# Patient Record
Sex: FEMALE | Race: WHITE | NOT HISPANIC OR LATINO
[De-identification: names, ages, dates, MRNs, and addresses within clinical notes are randomized per-mention and may not be internally consistent; named-entity substitution may affect disease eponyms.]

---

## 2017-10-04 ENCOUNTER — APPOINTMENT (OUTPATIENT)
Dept: CARDIOLOGY | Facility: CLINIC | Age: 67
End: 2017-10-04
Payer: COMMERCIAL

## 2017-10-04 VITALS
WEIGHT: 150 LBS | BODY MASS INDEX: 25.61 KG/M2 | SYSTOLIC BLOOD PRESSURE: 132 MMHG | DIASTOLIC BLOOD PRESSURE: 78 MMHG | OXYGEN SATURATION: 99 % | HEIGHT: 64 IN | HEART RATE: 90 BPM

## 2017-10-04 DIAGNOSIS — E78.5 HYPERLIPIDEMIA, UNSPECIFIED: ICD-10-CM

## 2017-10-04 DIAGNOSIS — R07.89 OTHER CHEST PAIN: ICD-10-CM

## 2017-10-04 DIAGNOSIS — Z00.00 ENCOUNTER FOR GENERAL ADULT MEDICAL EXAMINATION W/OUT ABNORMAL FINDINGS: ICD-10-CM

## 2017-10-04 PROCEDURE — 99205 OFFICE O/P NEW HI 60 MIN: CPT

## 2017-10-04 PROCEDURE — 93000 ELECTROCARDIOGRAM COMPLETE: CPT

## 2017-10-04 RX ORDER — LEVOTHYROXINE SODIUM 0.07 MG/1
75 TABLET ORAL
Qty: 90 | Refills: 0 | Status: ACTIVE | COMMUNITY
Start: 2017-09-07

## 2017-10-04 RX ORDER — SIMVASTATIN 20 MG/1
20 TABLET, FILM COATED ORAL
Qty: 90 | Refills: 0 | Status: DISCONTINUED | COMMUNITY
Start: 2017-09-07

## 2017-10-04 RX ORDER — AZITHROMYCIN 250 MG/1
250 TABLET, FILM COATED ORAL
Qty: 6 | Refills: 0 | Status: ACTIVE | COMMUNITY
Start: 2017-04-10

## 2017-10-04 RX ORDER — OSELTAMIVIR PHOSPHATE 75 MG/1
75 CAPSULE ORAL
Qty: 10 | Refills: 0 | Status: DISCONTINUED | COMMUNITY
Start: 2017-04-10

## 2017-10-04 RX ORDER — SIMVASTATIN 20 MG/1
20 TABLET, FILM COATED ORAL
Refills: 0 | Status: ACTIVE | COMMUNITY

## 2017-10-04 RX ORDER — LEVOTHYROXINE SODIUM 0.07 MG/1
75 TABLET ORAL
Refills: 0 | Status: ACTIVE | COMMUNITY

## 2017-11-13 ENCOUNTER — APPOINTMENT (OUTPATIENT)
Dept: CARDIOLOGY | Facility: CLINIC | Age: 67
End: 2017-11-13
Payer: COMMERCIAL

## 2017-11-13 PROCEDURE — 78452 HT MUSCLE IMAGE SPECT MULT: CPT

## 2017-11-13 PROCEDURE — 93015 CV STRESS TEST SUPVJ I&R: CPT

## 2017-11-13 PROCEDURE — A9502: CPT

## 2017-12-06 ENCOUNTER — APPOINTMENT (OUTPATIENT)
Dept: CARDIOLOGY | Facility: CLINIC | Age: 67
End: 2017-12-06

## 2021-06-28 ENCOUNTER — TRANSCRIPTION ENCOUNTER (OUTPATIENT)
Age: 71
End: 2021-06-28

## 2021-08-05 ENCOUNTER — OUTPATIENT (OUTPATIENT)
Dept: OUTPATIENT SERVICES | Facility: HOSPITAL | Age: 71
LOS: 1 days | End: 2021-08-05

## 2021-08-06 ENCOUNTER — APPOINTMENT (OUTPATIENT)
Dept: ULTRASOUND IMAGING | Facility: CLINIC | Age: 71
End: 2021-08-06
Payer: COMMERCIAL

## 2021-08-06 PROCEDURE — 76700 US EXAM ABDOM COMPLETE: CPT

## 2021-08-23 ENCOUNTER — TRANSCRIPTION ENCOUNTER (OUTPATIENT)
Age: 71
End: 2021-08-23

## 2022-01-26 ENCOUNTER — RESULT REVIEW (OUTPATIENT)
Age: 72
End: 2022-01-26

## 2022-03-07 ENCOUNTER — TRANSCRIPTION ENCOUNTER (OUTPATIENT)
Age: 72
End: 2022-03-07

## 2022-04-04 ENCOUNTER — TRANSCRIPTION ENCOUNTER (OUTPATIENT)
Age: 72
End: 2022-04-04

## 2022-06-05 ENCOUNTER — NON-APPOINTMENT (OUTPATIENT)
Age: 72
End: 2022-06-05

## 2022-12-12 ENCOUNTER — OFFICE (OUTPATIENT)
Dept: URBAN - METROPOLITAN AREA CLINIC 12 | Facility: CLINIC | Age: 72
Setting detail: OPHTHALMOLOGY
End: 2022-12-12
Payer: COMMERCIAL

## 2022-12-12 DIAGNOSIS — H25.12: ICD-10-CM

## 2022-12-12 DIAGNOSIS — H25.13: ICD-10-CM

## 2022-12-12 PROCEDURE — 99213 OFFICE O/P EST LOW 20 MIN: CPT | Performed by: OPHTHALMOLOGY

## 2022-12-12 PROCEDURE — 92136 OPHTHALMIC BIOMETRY: CPT | Performed by: OPHTHALMOLOGY

## 2022-12-12 ASSESSMENT — REFRACTION_CURRENTRX
OS_OVR_VA: 20/
OD_OVR_VA: 20/
OD_SPHERE: +1.50
OS_OVR_VA: 20/
OD_VPRISM_DIRECTION: PROGS
OD_AXIS: 113
OD_AXIS: 22
OD_CYLINDER: +1.00
OS_AXIS: 160
OD_SPHERE: +5.75
OD_VPRISM_DIRECTION: PROGS
OS_ADD: +2.50
OS_SPHERE: +3.00
OD_CYLINDER: -1.25
OS_VPRISM_DIRECTION: SV
OD_AXIS: 121
OD_VPRISM_DIRECTION: SV
OS_CYLINDER: -2.25
OS_ADD: +2.25
OS_SPHERE: -1.50
OS_CYLINDER: -1.50
OD_ADD: +2.50
OS_VPRISM_DIRECTION: PROGS
OD_OVR_VA: 20/
OD_OVR_VA: 20/
OS_VPRISM_DIRECTION: PROGS
OD_SPHERE: +4.00
OS_SPHERE: +1.25
OS_AXIS: 099
OS_CYLINDER: +1.00
OD_ADD: +2.25
OS_AXIS: 082
OD_CYLINDER: -1.00
OS_OVR_VA: 20/

## 2022-12-12 ASSESSMENT — AXIALLENGTH_DERIVED
OS_AL: 23.5947
OD_AL: 22.3028
OS_AL: 23.4495
OS_AL: 23.5947
OD_AL: 22.2594
OD_AL: 22.2161

## 2022-12-12 ASSESSMENT — REFRACTION_AUTOREFRACTION
OS_CYLINDER: -1.75
OD_AXIS: 121
OS_AXIS: 085
OS_SPHERE: +0.75
OD_SPHERE: +4.25
OD_CYLINDER: -1.25

## 2022-12-12 ASSESSMENT — REFRACTION_MANIFEST
OS_AXIS: 085
OU_VA: 20/20-1
OS_VA2: 20/20
OD_CYLINDER: -1.50
OS_VA2: 20/20
OS_CYLINDER: -2.00
OS_VA1: 20/NI
OD_SPHERE: +4.25
OD_AXIS: 111
OS_SPHERE: +1.25
OD_VA1: 20/NI
OS_ADD: +2.50
OD_SPHERE: +4.25
OD_AXIS: 120
OS_CYLINDER: -1.75
OS_AXIS: 85
OS_SPHERE: +0.75
OD_ADD: +2.50
OD_VA2: 20/20
OS_VA1: 20/25
OD_ADD: +2.50
OD_VA2: 20/20
OU_VA: 20/20-1
OS_ADD: +2.50
OD_VA1: 20/25+
OD_CYLINDER: -1.00

## 2022-12-12 ASSESSMENT — CONFRONTATIONAL VISUAL FIELD TEST (CVF)
OS_FINDINGS: FULL
OD_FINDINGS: FULL

## 2022-12-12 ASSESSMENT — SPHEQUIV_DERIVED
OS_SPHEQUIV: 0.25
OS_SPHEQUIV: -0.125
OD_SPHEQUIV: 3.625
OD_SPHEQUIV: 3.5
OS_SPHEQUIV: -0.125
OD_SPHEQUIV: 3.75

## 2022-12-12 ASSESSMENT — TONOMETRY
OS_IOP_MMHG: 11
OD_IOP_MMHG: 12

## 2022-12-12 ASSESSMENT — VISUAL ACUITY
OD_BCVA: 20/30-2
OS_BCVA: 20/30

## 2022-12-12 ASSESSMENT — KERATOMETRY
OD_AXISANGLE_DEGREES: 021
OS_AXISANGLE_DEGREES: 001
OS_K1POWER_DIOPTERS: 42.75
OS_K2POWER_DIOPTERS: 44.50
METHOD_AUTO_MANUAL: AUTO
OD_K1POWER_DIOPTERS: 42.75
OD_K2POWER_DIOPTERS: 44.25

## 2022-12-12 ASSESSMENT — LID POSITION - DERMATOCHALASIS
OS_DERMATOCHALASIS: LUL 1+
OD_DERMATOCHALASIS: RUL 1+

## 2022-12-12 ASSESSMENT — LID EXAM ASSESSMENTS
OD_BLEPHARITIS: RUL 1+
OS_BLEPHARITIS: LUL 1+

## 2022-12-20 ENCOUNTER — APPOINTMENT (OUTPATIENT)
Dept: ORTHOPEDIC SURGERY | Facility: CLINIC | Age: 72
End: 2022-12-20

## 2022-12-20 VITALS — BODY MASS INDEX: 25.61 KG/M2 | WEIGHT: 150 LBS | HEIGHT: 64 IN

## 2022-12-20 DIAGNOSIS — Z78.9 OTHER SPECIFIED HEALTH STATUS: ICD-10-CM

## 2022-12-20 DIAGNOSIS — E78.00 PURE HYPERCHOLESTEROLEMIA, UNSPECIFIED: ICD-10-CM

## 2022-12-20 PROCEDURE — 73130 X-RAY EXAM OF HAND: CPT | Mod: 50

## 2022-12-20 PROCEDURE — 99204 OFFICE O/P NEW MOD 45 MIN: CPT

## 2022-12-20 PROCEDURE — 99072 ADDL SUPL MATRL&STAF TM PHE: CPT
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
## 2022-12-25 NOTE — ASSESSMENT
[FreeTextEntry1] : Bilateral CTS - reviewed pathoanatomy. Encouraged nighttime cockup wrist bracing. Consider EMG/NCV if symptoms persist. NSAIDs, minimize use.\par \par F/u 4weeks

## 2022-12-25 NOTE — IMAGING
[de-identified] : Right wrist with no swelling nor erythema. Able to make fist, oppose thumb to small finger and abduct fingers, no overt atrophy. +Phalen's, -tinel at carpal, -tinel at Guyon, -tinel at cubital. -froment, -wartenberg. Intact sensation in median and intact at superficial radial and intact in small and ulnar ring finger(normal at ulnar hand) prior to provocative testing. <2sec cap refill. \par \par Right wrist radiographs with no fracture nor dislocation. Carpus aligned.\par \par \par Left wrist with no swelling nor erythema. Able to make fist, oppose thumb to small finger and abduct fingers, no overt atrophy. +Phalen's, -tinel at carpal, -tinel at Guyon, -tinel at cubital. -froment, -wartenberg. Intact sensation in median and intact at superficial radial and intact in small and ulnar ring finger(normal at ulnar hand) prior to provocative testing. <2sec cap refill. \par \par Left wrist radiographs with no fracture nor dislocation. Carpus aligned.

## 2023-01-06 ENCOUNTER — OFFICE (OUTPATIENT)
Dept: URBAN - METROPOLITAN AREA CLINIC 12 | Facility: CLINIC | Age: 73
Setting detail: OPHTHALMOLOGY
End: 2023-01-06
Payer: COMMERCIAL

## 2023-01-06 DIAGNOSIS — Z01.812: ICD-10-CM

## 2023-01-06 DIAGNOSIS — Z20.822: ICD-10-CM

## 2023-01-06 PROCEDURE — 99211 OFF/OP EST MAY X REQ PHY/QHP: CPT | Performed by: OPHTHALMOLOGY

## 2023-01-06 ASSESSMENT — REFRACTION_MANIFEST
OS_AXIS: 085
OS_VA2: 20/20
OS_ADD: +2.50
OD_AXIS: 120
OS_AXIS: 85
OD_ADD: +2.50
OD_ADD: +2.50
OS_VA2: 20/20
OS_VA1: 20/NI
OS_ADD: +2.50
OS_VA1: 20/25
OS_SPHERE: +0.75
OS_CYLINDER: -2.00
OD_VA2: 20/20
OD_SPHERE: +4.25
OD_AXIS: 111
OD_VA2: 20/20
OU_VA: 20/20-1
OD_SPHERE: +4.25
OS_CYLINDER: -1.75
OU_VA: 20/20-1
OD_CYLINDER: -1.50
OD_VA1: 20/25+
OD_CYLINDER: -1.00
OD_VA1: 20/NI
OS_SPHERE: +1.25

## 2023-01-06 ASSESSMENT — REFRACTION_CURRENTRX
OD_CYLINDER: +1.00
OS_VPRISM_DIRECTION: PROGS
OS_OVR_VA: 20/
OS_CYLINDER: -1.50
OD_OVR_VA: 20/
OS_OVR_VA: 20/
OD_ADD: +2.50
OS_SPHERE: +1.25
OS_SPHERE: +3.00
OS_AXIS: 082
OS_SPHERE: -1.50
OD_AXIS: 22
OD_OVR_VA: 20/
OD_CYLINDER: -1.00
OS_VPRISM_DIRECTION: SV
OD_ADD: +2.25
OD_CYLINDER: -1.25
OD_VPRISM_DIRECTION: PROGS
OD_SPHERE: +5.75
OD_AXIS: 113
OD_OVR_VA: 20/
OS_AXIS: 160
OS_VPRISM_DIRECTION: PROGS
OS_AXIS: 099
OD_SPHERE: +1.50
OS_CYLINDER: -2.25
OS_CYLINDER: +1.00
OD_AXIS: 121
OD_VPRISM_DIRECTION: SV
OD_VPRISM_DIRECTION: PROGS
OS_ADD: +2.50
OD_SPHERE: +4.00
OS_OVR_VA: 20/
OS_ADD: +2.25

## 2023-01-06 ASSESSMENT — AXIALLENGTH_DERIVED
OD_AL: 22.2594
OD_AL: 22.2161
OS_AL: 23.5947
OS_AL: 23.5947
OD_AL: 22.3028
OS_AL: 23.4495

## 2023-01-06 ASSESSMENT — SPHEQUIV_DERIVED
OD_SPHEQUIV: 3.625
OS_SPHEQUIV: -0.125
OS_SPHEQUIV: -0.125
OS_SPHEQUIV: 0.25
OD_SPHEQUIV: 3.75
OD_SPHEQUIV: 3.5

## 2023-01-06 ASSESSMENT — KERATOMETRY
OD_K2POWER_DIOPTERS: 44.25
OS_K1POWER_DIOPTERS: 42.75
OS_K2POWER_DIOPTERS: 44.50
OD_K1POWER_DIOPTERS: 42.75
OS_AXISANGLE_DEGREES: 001
OD_AXISANGLE_DEGREES: 021
METHOD_AUTO_MANUAL: AUTO

## 2023-01-06 ASSESSMENT — VISUAL ACUITY
OS_BCVA: 20/30
OD_BCVA: 20/30-2

## 2023-01-06 ASSESSMENT — REFRACTION_AUTOREFRACTION
OD_CYLINDER: -1.25
OS_AXIS: 085
OS_CYLINDER: -1.75
OD_AXIS: 121
OD_SPHERE: +4.25
OS_SPHERE: +0.75

## 2023-01-10 ENCOUNTER — AMBULATORY SURGERY CENTER (OUTPATIENT)
Dept: URBAN - METROPOLITAN AREA SURGERY 27 | Facility: SURGERY | Age: 73
Setting detail: OPHTHALMOLOGY
End: 2023-01-10
Payer: COMMERCIAL

## 2023-01-10 DIAGNOSIS — H52.212: ICD-10-CM

## 2023-01-10 DIAGNOSIS — H25.12: ICD-10-CM

## 2023-01-10 PROCEDURE — FEMTO CATARACT LASER: Performed by: OPHTHALMOLOGY

## 2023-01-10 PROCEDURE — 66984 XCAPSL CTRC RMVL W/O ECP: CPT | Performed by: OPHTHALMOLOGY

## 2023-01-11 ENCOUNTER — RX ONLY (RX ONLY)
Age: 73
End: 2023-01-11

## 2023-01-11 ENCOUNTER — OFFICE (OUTPATIENT)
Dept: URBAN - METROPOLITAN AREA CLINIC 12 | Facility: CLINIC | Age: 73
Setting detail: OPHTHALMOLOGY
End: 2023-01-11
Payer: COMMERCIAL

## 2023-01-11 DIAGNOSIS — Z96.1: ICD-10-CM

## 2023-01-11 PROCEDURE — 99024 POSTOP FOLLOW-UP VISIT: CPT | Performed by: OPTOMETRIST

## 2023-01-11 ASSESSMENT — REFRACTION_CURRENTRX
OS_ADD: +2.50
OD_SPHERE: +4.00
OS_CYLINDER: -2.25
OS_OVR_VA: 20/
OD_ADD: +2.50
OD_ADD: +2.25
OS_AXIS: 160
OD_AXIS: 113
OD_SPHERE: +5.75
OD_AXIS: 22
OS_OVR_VA: 20/
OD_CYLINDER: -1.00
OS_AXIS: 082
OD_OVR_VA: 20/
OS_CYLINDER: -1.50
OD_VPRISM_DIRECTION: PROGS
OS_SPHERE: +1.25
OD_AXIS: 121
OD_VPRISM_DIRECTION: SV
OD_OVR_VA: 20/
OS_SPHERE: +3.00
OD_OVR_VA: 20/
OS_AXIS: 099
OS_VPRISM_DIRECTION: SV
OS_CYLINDER: +1.00
OD_VPRISM_DIRECTION: PROGS
OS_ADD: +2.25
OS_OVR_VA: 20/
OS_SPHERE: -1.50
OS_VPRISM_DIRECTION: PROGS
OD_CYLINDER: -1.25
OD_CYLINDER: +1.00
OD_SPHERE: +1.50
OS_VPRISM_DIRECTION: PROGS

## 2023-01-11 ASSESSMENT — SPHEQUIV_DERIVED
OD_SPHEQUIV: 3.75
OS_SPHEQUIV: -0.5
OD_SPHEQUIV: 3.5
OS_SPHEQUIV: -0.125
OS_SPHEQUIV: 0.25
OD_SPHEQUIV: 3.625

## 2023-01-11 ASSESSMENT — KERATOMETRY
OD_K2POWER_DIOPTERS: 44.00
OS_K1POWER_DIOPTERS: 43.00
OS_AXISANGLE_DEGREES: 179
METHOD_AUTO_MANUAL: AUTO
OD_K1POWER_DIOPTERS: 42.75
OS_K2POWER_DIOPTERS: 44.00
OD_AXISANGLE_DEGREES: 015

## 2023-01-11 ASSESSMENT — LID POSITION - DERMATOCHALASIS
OS_DERMATOCHALASIS: LUL 1+
OD_DERMATOCHALASIS: RUL 1+

## 2023-01-11 ASSESSMENT — REFRACTION_AUTOREFRACTION
OD_AXIS: 109
OD_CYLINDER: -1.25
OS_SPHERE: -0.25
OS_AXIS: 126
OS_CYLINDER: -0.50
OD_SPHERE: +4.25

## 2023-01-11 ASSESSMENT — AXIALLENGTH_DERIVED
OD_AL: 22.3003
OD_AL: 22.3439
OS_AL: 23.4949
OD_AL: 22.2569
OS_AL: 23.7883
OS_AL: 23.6407

## 2023-01-11 ASSESSMENT — REFRACTION_MANIFEST
OS_SPHERE: +0.75
OD_SPHERE: +4.25
OD_AXIS: 120
OS_SPHERE: +1.25
OD_ADD: +2.50
OS_VA1: 20/25
OS_AXIS: 085
OD_VA2: 20/20
OD_VA1: 20/NI
OS_VA1: 20/NI
OU_VA: 20/20-1
OS_ADD: +2.50
OS_VA2: 20/20
OD_SPHERE: +4.25
OD_VA1: 20/25+
OS_CYLINDER: -1.75
OD_VA2: 20/20
OU_VA: 20/20-1
OD_CYLINDER: -1.00
OD_ADD: +2.50
OD_CYLINDER: -1.50
OS_CYLINDER: -2.00
OD_AXIS: 111
OS_ADD: +2.50
OS_VA2: 20/20
OS_AXIS: 85

## 2023-01-11 ASSESSMENT — CONFRONTATIONAL VISUAL FIELD TEST (CVF)
OD_FINDINGS: FULL
OS_FINDINGS: FULL

## 2023-01-11 ASSESSMENT — VISUAL ACUITY
OS_BCVA: 20/25-1
OD_BCVA: 20/25-2

## 2023-01-11 ASSESSMENT — TONOMETRY: OD_IOP_MMHG: 14

## 2023-01-11 ASSESSMENT — LID EXAM ASSESSMENTS
OD_BLEPHARITIS: RUL 1+
OS_BLEPHARITIS: LUL 1+

## 2023-01-16 ENCOUNTER — OFFICE (OUTPATIENT)
Dept: URBAN - METROPOLITAN AREA CLINIC 12 | Facility: CLINIC | Age: 73
Setting detail: OPHTHALMOLOGY
End: 2023-01-16
Payer: COMMERCIAL

## 2023-01-16 DIAGNOSIS — H25.11: ICD-10-CM

## 2023-01-16 PROCEDURE — 92136 OPHTHALMIC BIOMETRY: CPT | Performed by: OPHTHALMOLOGY

## 2023-01-16 ASSESSMENT — REFRACTION_MANIFEST
OS_VA2: 20/20
OS_AXIS: 085
OD_ADD: +2.50
OU_VA: 20/20-1
OS_ADD: +2.50
OD_VA1: 20/25+
OS_CYLINDER: -2.00
OD_SPHERE: +4.25
OD_AXIS: 111
OS_SPHERE: +1.25
OD_VA2: 20/20
OD_VA1: 20/NI
OS_VA2: 20/20
OD_CYLINDER: -1.50
OS_SPHERE: +0.75
OD_ADD: +2.50
OS_CYLINDER: -1.75
OD_AXIS: 120
OD_VA2: 20/20
OS_VA1: 20/25
OS_VA1: 20/NI
OU_VA: 20/20-1
OD_SPHERE: +4.25
OD_CYLINDER: -1.00
OS_AXIS: 85
OS_ADD: +2.50

## 2023-01-16 ASSESSMENT — KERATOMETRY
OS_AXISANGLE_DEGREES: 175
OD_K2POWER_DIOPTERS: 44.00
METHOD_AUTO_MANUAL: AUTO
OD_K1POWER_DIOPTERS: 42.50
OD_AXISANGLE_DEGREES: 21
OS_K1POWER_DIOPTERS: 43.00
OS_K2POWER_DIOPTERS: 44.25

## 2023-01-16 ASSESSMENT — LID EXAM ASSESSMENTS
OS_BLEPHARITIS: LUL 1+
OD_BLEPHARITIS: RUL 1+

## 2023-01-16 ASSESSMENT — REFRACTION_CURRENTRX
OD_CYLINDER: +1.00
OD_SPHERE: +1.50
OD_SPHERE: +5.75
OD_AXIS: 116
OD_OVR_VA: 20/
OD_VPRISM_DIRECTION: PROGS
OD_SPHERE: +4.25
OS_OVR_VA: 20/
OS_VPRISM_DIRECTION: SV
OD_AXIS: 113
OD_CYLINDER: -1.25
OS_CYLINDER: +1.00
OS_VPRISM_DIRECTION: PROGS
OS_ADD: +2.25
OS_OVR_VA: 20/
OD_OVR_VA: 20/
OD_OVR_VA: 20/
OS_AXIS: 160
OS_AXIS: 082
OD_ADD: +2.25
OS_SPHERE: +3.00
OD_VPRISM_DIRECTION: SV
OS_OVR_VA: 20/
OD_CYLINDER: -0.75
OD_AXIS: 22
OS_CYLINDER: -1.50
OS_SPHERE: -1.50
OD_VPRISM_DIRECTION: PROGS
OD_ADD: +2.50

## 2023-01-16 ASSESSMENT — CONFRONTATIONAL VISUAL FIELD TEST (CVF)
OD_FINDINGS: FULL
OS_FINDINGS: FULL

## 2023-01-16 ASSESSMENT — SPHEQUIV_DERIVED
OS_SPHEQUIV: 0.25
OD_SPHEQUIV: 3.5
OS_SPHEQUIV: -0.625
OS_SPHEQUIV: -0.125
OD_SPHEQUIV: 3.625
OD_SPHEQUIV: 3.75

## 2023-01-16 ASSESSMENT — REFRACTION_AUTOREFRACTION
OD_CYLINDER: -1.25
OS_AXIS: 146
OS_CYLINDER: -0.25
OD_AXIS: 114
OD_SPHERE: +4.25
OS_SPHERE: -0.50

## 2023-01-16 ASSESSMENT — AXIALLENGTH_DERIVED
OD_AL: 22.2978
OS_AL: 23.4495
OD_AL: 22.3414
OS_AL: 23.7912
OS_AL: 23.5947
OD_AL: 22.3852

## 2023-01-16 ASSESSMENT — VISUAL ACUITY
OS_BCVA: 20/20-1
OD_BCVA: 20/25+2

## 2023-01-16 ASSESSMENT — LID POSITION - DERMATOCHALASIS
OS_DERMATOCHALASIS: LUL 1+
OD_DERMATOCHALASIS: RUL 1+

## 2023-01-16 ASSESSMENT — TONOMETRY
OD_IOP_MMHG: 15
OS_IOP_MMHG: 16

## 2023-01-20 ENCOUNTER — APPOINTMENT (OUTPATIENT)
Dept: ORTHOPEDIC SURGERY | Facility: CLINIC | Age: 73
End: 2023-01-20
Payer: COMMERCIAL

## 2023-01-20 ENCOUNTER — OFFICE (OUTPATIENT)
Dept: URBAN - METROPOLITAN AREA CLINIC 12 | Facility: CLINIC | Age: 73
Setting detail: OPHTHALMOLOGY
End: 2023-01-20
Payer: COMMERCIAL

## 2023-01-20 DIAGNOSIS — Z01.812: ICD-10-CM

## 2023-01-20 DIAGNOSIS — Z20.822: ICD-10-CM

## 2023-01-20 DIAGNOSIS — G56.00 CARPAL TUNNEL SYNDROME, UNSPECIFIED UPPER LIMB: ICD-10-CM

## 2023-01-20 PROCEDURE — 99214 OFFICE O/P EST MOD 30 MIN: CPT

## 2023-01-20 PROCEDURE — 99072 ADDL SUPL MATRL&STAF TM PHE: CPT

## 2023-01-20 PROCEDURE — 99211 OFF/OP EST MAY X REQ PHY/QHP: CPT | Performed by: OPHTHALMOLOGY

## 2023-01-20 ASSESSMENT — REFRACTION_AUTOREFRACTION
OS_SPHERE: -0.50
OD_AXIS: 114
OS_CYLINDER: -0.25
OD_SPHERE: +4.25
OD_CYLINDER: -1.25
OS_AXIS: 146

## 2023-01-20 ASSESSMENT — REFRACTION_CURRENTRX
OD_SPHERE: +1.50
OS_SPHERE: +3.00
OD_ADD: +2.50
OD_CYLINDER: -0.75
OS_ADD: +2.25
OS_VPRISM_DIRECTION: PROGS
OD_AXIS: 113
OD_VPRISM_DIRECTION: SV
OD_AXIS: 116
OD_SPHERE: +4.25
OS_CYLINDER: +1.00
OS_CYLINDER: -1.50
OS_AXIS: 160
OS_OVR_VA: 20/
OD_SPHERE: +5.75
OD_OVR_VA: 20/
OD_CYLINDER: +1.00
OD_ADD: +2.25
OS_SPHERE: -1.50
OS_OVR_VA: 20/
OS_OVR_VA: 20/
OD_CYLINDER: -1.25
OS_AXIS: 082
OD_VPRISM_DIRECTION: PROGS
OD_AXIS: 22
OD_VPRISM_DIRECTION: PROGS
OS_VPRISM_DIRECTION: SV
OD_OVR_VA: 20/
OD_OVR_VA: 20/

## 2023-01-20 ASSESSMENT — AXIALLENGTH_DERIVED
OS_AL: 23.7912
OD_AL: 22.3852
OS_AL: 23.4495
OS_AL: 23.5947
OD_AL: 22.3414
OD_AL: 22.2978

## 2023-01-20 ASSESSMENT — REFRACTION_MANIFEST
OD_CYLINDER: -1.00
OD_VA2: 20/20
OU_VA: 20/20-1
OD_VA1: 20/25+
OS_CYLINDER: -1.75
OD_VA2: 20/20
OS_VA2: 20/20
OS_AXIS: 085
OD_ADD: +2.50
OS_SPHERE: +1.25
OS_ADD: +2.50
OD_ADD: +2.50
OS_AXIS: 85
OS_CYLINDER: -2.00
OD_SPHERE: +4.25
OD_AXIS: 111
OS_VA2: 20/20
OD_CYLINDER: -1.50
OS_SPHERE: +0.75
OS_VA1: 20/25
OD_SPHERE: +4.25
OS_VA1: 20/NI
OU_VA: 20/20-1
OD_AXIS: 120
OS_ADD: +2.50
OD_VA1: 20/NI

## 2023-01-20 ASSESSMENT — KERATOMETRY
OS_AXISANGLE_DEGREES: 175
OD_K2POWER_DIOPTERS: 44.00
OD_K1POWER_DIOPTERS: 42.50
OS_K1POWER_DIOPTERS: 43.00
METHOD_AUTO_MANUAL: AUTO
OD_AXISANGLE_DEGREES: 21
OS_K2POWER_DIOPTERS: 44.25

## 2023-01-20 ASSESSMENT — SPHEQUIV_DERIVED
OS_SPHEQUIV: -0.625
OD_SPHEQUIV: 3.625
OS_SPHEQUIV: -0.125
OS_SPHEQUIV: 0.25
OD_SPHEQUIV: 3.5
OD_SPHEQUIV: 3.75

## 2023-01-20 ASSESSMENT — VISUAL ACUITY
OS_BCVA: 20/20-1
OD_BCVA: 20/25+2

## 2023-01-20 NOTE — IMAGING
[de-identified] : Right wrist with no swelling nor erythema. Able to make fist, oppose thumb to small finger and abduct fingers, no overt atrophy. +Phalen's, -tinel at carpal, -tinel at Guyon, -tinel at cubital. -froment, -wartenberg. Intact sensation in median and intact at superficial radial and intact in small and ulnar ring finger(normal at ulnar hand) prior to provocative testing. <2sec cap refill. \par \par Right wrist radiographs with no fracture nor dislocation. Carpus aligned.\par \par \par Left wrist with no swelling nor erythema. Able to make fist, oppose thumb to small finger and abduct fingers, no overt atrophy. +Phalen's, -tinel at carpal, -tinel at Guyon, -tinel at cubital. -froment, -wartenberg. Intact sensation in median and intact at superficial radial and intact in small and ulnar ring finger(normal at ulnar hand) prior to provocative testing. <2sec cap refill. \par \par Left wrist radiographs with no fracture nor dislocation. Carpus aligned.

## 2023-01-20 NOTE — ASSESSMENT
[FreeTextEntry1] : Bilateral CTS - reviewed pathoanatomy. Encouraged nighttime cockup wrist bracing. Will obtain bilateral UE EMG/NCV and followup thereafter. Discussed that the discoloration of the fingers may be secondary to a vascular insult, possible Raynauds. Ring finger (R>L) appear to possess a hyperemia reflow.\par \par F/u after EMG/NCV
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
## 2023-01-24 ENCOUNTER — AMBULATORY SURGERY CENTER (OUTPATIENT)
Dept: URBAN - METROPOLITAN AREA SURGERY 27 | Facility: SURGERY | Age: 73
Setting detail: OPHTHALMOLOGY
End: 2023-01-24
Payer: COMMERCIAL

## 2023-01-24 DIAGNOSIS — H25.11: ICD-10-CM

## 2023-01-24 DIAGNOSIS — H52.211: ICD-10-CM

## 2023-01-24 PROCEDURE — 66984 XCAPSL CTRC RMVL W/O ECP: CPT | Performed by: OPHTHALMOLOGY

## 2023-01-24 PROCEDURE — FEMTO CATARACT LASER: Performed by: OPHTHALMOLOGY

## 2023-01-25 ENCOUNTER — OFFICE (OUTPATIENT)
Dept: URBAN - METROPOLITAN AREA CLINIC 12 | Facility: CLINIC | Age: 73
Setting detail: OPHTHALMOLOGY
End: 2023-01-25
Payer: COMMERCIAL

## 2023-01-25 ENCOUNTER — RX ONLY (RX ONLY)
Age: 73
End: 2023-01-25

## 2023-01-25 DIAGNOSIS — Z96.1: ICD-10-CM

## 2023-01-25 PROCEDURE — 99024 POSTOP FOLLOW-UP VISIT: CPT | Performed by: OPTOMETRIST

## 2023-01-25 ASSESSMENT — REFRACTION_CURRENTRX
OD_SPHERE: +4.25
OS_VPRISM_DIRECTION: PROGS
OD_VPRISM_DIRECTION: SV
OD_CYLINDER: -0.75
OS_CYLINDER: -1.50
OD_AXIS: 113
OD_ADD: +2.25
OS_ADD: +2.25
OS_AXIS: 160
OS_SPHERE: -1.50
OD_CYLINDER: -1.25
OS_OVR_VA: 20/
OD_AXIS: 22
OD_OVR_VA: 20/
OS_AXIS: 082
OD_OVR_VA: 20/
OD_SPHERE: +5.75
OD_VPRISM_DIRECTION: PROGS
OD_ADD: +2.50
OS_OVR_VA: 20/
OS_OVR_VA: 20/
OS_SPHERE: +3.00
OD_CYLINDER: +1.00
OS_CYLINDER: +1.00
OS_VPRISM_DIRECTION: SV
OD_AXIS: 116
OD_SPHERE: +1.50
OD_OVR_VA: 20/
OD_VPRISM_DIRECTION: PROGS

## 2023-01-25 ASSESSMENT — REFRACTION_MANIFEST
OS_VA1: 20/NI
OD_SPHERE: +4.25
OD_VA2: 20/20
OD_VA2: 20/20
OD_CYLINDER: -1.00
OU_VA: 20/20-1
OS_VA2: 20/20
OS_CYLINDER: -2.00
OD_AXIS: 120
OS_CYLINDER: -1.75
OD_VA1: 20/25+
OD_ADD: +2.50
OD_ADD: +2.50
OS_AXIS: 085
OS_VA2: 20/20
OU_VA: 20/20-1
OS_ADD: +2.50
OD_VA1: 20/NI
OD_AXIS: 111
OD_SPHERE: +4.25
OS_AXIS: 85
OS_SPHERE: +0.75
OD_CYLINDER: -1.50
OS_VA1: 20/25
OS_SPHERE: +1.25
OS_ADD: +2.50

## 2023-01-25 ASSESSMENT — AXIALLENGTH_DERIVED
OD_AL: 22.3852
OD_AL: 23.6841
OS_AL: 23.5947
OS_AL: 23.4495
OD_AL: 22.2978
OS_AL: 23.5461

## 2023-01-25 ASSESSMENT — KERATOMETRY
OD_K1POWER_DIOPTERS: 42.75
OS_AXISANGLE_DEGREES: 173
OS_K2POWER_DIOPTERS: 44.25
OD_AXISANGLE_DEGREES: 024
METHOD_AUTO_MANUAL: AUTO
OS_K1POWER_DIOPTERS: 43.00
OD_K2POWER_DIOPTERS: 43.75

## 2023-01-25 ASSESSMENT — LID EXAM ASSESSMENTS
OS_BLEPHARITIS: LUL 1+
OD_BLEPHARITIS: RUL 1+

## 2023-01-25 ASSESSMENT — TONOMETRY
OS_IOP_MMHG: 16
OD_IOP_MMHG: 18

## 2023-01-25 ASSESSMENT — REFRACTION_AUTOREFRACTION
OS_CYLINDER: -0.50
OS_AXIS: 163
OD_SPHERE: +0.25
OD_CYLINDER: -0.50
OD_AXIS: 019
OS_SPHERE: +0.25

## 2023-01-25 ASSESSMENT — SPHEQUIV_DERIVED
OS_SPHEQUIV: 0.25
OD_SPHEQUIV: 3.75
OS_SPHEQUIV: -0.125
OS_SPHEQUIV: 0
OD_SPHEQUIV: 0
OD_SPHEQUIV: 3.5

## 2023-01-25 ASSESSMENT — LID POSITION - DERMATOCHALASIS
OS_DERMATOCHALASIS: LUL 1+
OD_DERMATOCHALASIS: RUL 1+

## 2023-01-25 ASSESSMENT — VISUAL ACUITY
OD_BCVA: 20/20-2
OS_BCVA: 20/25-2

## 2023-01-25 ASSESSMENT — CONFRONTATIONAL VISUAL FIELD TEST (CVF)
OS_FINDINGS: FULL
OD_FINDINGS: FULL

## 2023-01-30 ENCOUNTER — OFFICE (OUTPATIENT)
Dept: URBAN - METROPOLITAN AREA CLINIC 12 | Facility: CLINIC | Age: 73
Setting detail: OPHTHALMOLOGY
End: 2023-01-30
Payer: COMMERCIAL

## 2023-01-30 DIAGNOSIS — Z96.1: ICD-10-CM

## 2023-01-30 PROCEDURE — 99024 POSTOP FOLLOW-UP VISIT: CPT | Performed by: OPTOMETRIST

## 2023-01-30 ASSESSMENT — REFRACTION_CURRENTRX
OD_VPRISM_DIRECTION: PROGS
OS_OVR_VA: 20/
OD_SPHERE: +5.75
OS_SPHERE: +3.00
OD_VPRISM_DIRECTION: PROGS
OD_OVR_VA: 20/
OD_AXIS: 113
OS_AXIS: 160
OD_CYLINDER: -1.25
OD_AXIS: 116
OS_VPRISM_DIRECTION: SV
OS_OVR_VA: 20/
OS_VPRISM_DIRECTION: PROGS
OS_SPHERE: -1.50
OD_ADD: +2.25
OS_CYLINDER: -1.50
OD_SPHERE: +4.25
OS_AXIS: 082
OD_SPHERE: +1.50
OS_CYLINDER: +1.00
OS_ADD: +2.25
OD_OVR_VA: 20/
OD_VPRISM_DIRECTION: SV
OS_OVR_VA: 20/
OD_AXIS: 22
OD_ADD: +2.50
OD_OVR_VA: 20/
OD_CYLINDER: -0.75
OD_CYLINDER: +1.00

## 2023-01-30 ASSESSMENT — VISUAL ACUITY
OD_BCVA: 20/25-2
OS_BCVA: 20/25+2

## 2023-01-30 ASSESSMENT — CONFRONTATIONAL VISUAL FIELD TEST (CVF)
OD_FINDINGS: FULL
OS_FINDINGS: FULL

## 2023-01-30 ASSESSMENT — REFRACTION_MANIFEST
OD_ADD: +2.50
OS_SPHERE: +1.25
OS_CYLINDER: -1.75
OS_AXIS: 085
OD_VA2: 20/20
OS_AXIS: 85
OS_VA2: 20/20
OU_VA: 20/20-1
OS_CYLINDER: -2.00
OS_VA1: 20/NI
OS_VA1: 20/25
OD_AXIS: 120
OS_ADD: +2.50
OS_VA2: 20/20
OD_CYLINDER: -1.50
OD_VA2: 20/20
OD_VA1: 20/NI
OD_SPHERE: +4.25
OD_ADD: +2.50
OS_SPHERE: +0.75
OD_CYLINDER: -1.00
OS_ADD: +2.50
OD_SPHERE: +4.25
OD_VA1: 20/25+
OD_AXIS: 111
OU_VA: 20/20-1

## 2023-01-30 ASSESSMENT — SPHEQUIV_DERIVED
OD_SPHEQUIV: 3.5
OS_SPHEQUIV: -0.625
OD_SPHEQUIV: 3.75
OS_SPHEQUIV: 0.25
OS_SPHEQUIV: -0.125

## 2023-01-30 ASSESSMENT — AXIALLENGTH_DERIVED
OS_AL: 23.5489
OD_AL: 22.3439
OS_AL: 23.7446
OD_AL: 22.2569
OS_AL: 23.4042

## 2023-01-30 ASSESSMENT — KERATOMETRY
METHOD_AUTO_MANUAL: AUTO
OS_K1POWER_DIOPTERS: 43.25
OD_AXISANGLE_DEGREES: 020
OS_AXISANGLE_DEGREES: 172
OD_K1POWER_DIOPTERS: 42.75
OD_K2POWER_DIOPTERS: 44.00
OS_K2POWER_DIOPTERS: 44.25

## 2023-01-30 ASSESSMENT — REFRACTION_AUTOREFRACTION
OS_CYLINDER: -0.25
OS_SPHERE: -0.50
OD_AXIS: 0
OD_SPHERE: PLANO
OD_CYLINDER: SPHE
OS_AXIS: 162

## 2023-01-30 ASSESSMENT — LID POSITION - DERMATOCHALASIS
OS_DERMATOCHALASIS: LUL 1+
OD_DERMATOCHALASIS: RUL 1+

## 2023-01-30 ASSESSMENT — TONOMETRY
OS_IOP_MMHG: 17
OD_IOP_MMHG: 15

## 2023-01-30 ASSESSMENT — LID EXAM ASSESSMENTS
OS_BLEPHARITIS: LUL 1+
OD_BLEPHARITIS: RUL 1+

## 2023-01-31 ENCOUNTER — APPOINTMENT (OUTPATIENT)
Dept: NEUROLOGY | Facility: CLINIC | Age: 73
End: 2023-01-31
Payer: COMMERCIAL

## 2023-01-31 DIAGNOSIS — R20.0 ANESTHESIA OF SKIN: ICD-10-CM

## 2023-01-31 DIAGNOSIS — M79.2 NEURALGIA AND NEURITIS, UNSPECIFIED: ICD-10-CM

## 2023-01-31 PROCEDURE — 99072 ADDL SUPL MATRL&STAF TM PHE: CPT

## 2023-01-31 PROCEDURE — 95886 MUSC TEST DONE W/N TEST COMP: CPT | Mod: NC

## 2023-01-31 PROCEDURE — 95912 NRV CNDJ TEST 11-12 STUDIES: CPT

## 2023-02-10 ENCOUNTER — APPOINTMENT (OUTPATIENT)
Dept: ORTHOPEDIC SURGERY | Facility: CLINIC | Age: 73
End: 2023-02-10
Payer: COMMERCIAL

## 2023-02-10 DIAGNOSIS — G56.03 CARPAL TUNNEL SYNDROM,BILATERAL UPPER LIMBS: ICD-10-CM

## 2023-02-10 PROCEDURE — 99214 OFFICE O/P EST MOD 30 MIN: CPT

## 2023-02-10 PROCEDURE — 99072 ADDL SUPL MATRL&STAF TM PHE: CPT

## 2023-02-10 NOTE — IMAGING
[de-identified] : Right wrist with no swelling nor erythema. Able to make fist, oppose thumb to small finger and abduct fingers, no overt atrophy. +Phalen's, -tinel at carpal, -tinel at Guyon, -tinel at cubital. -froment, -wartenberg. Intact sensation in median and intact at superficial radial and intact in small and ulnar ring finger(normal at ulnar hand) prior to provocative testing. <2sec cap refill. \par \par Right wrist radiographs with no fracture nor dislocation. Carpus aligned.\par \par \par Left wrist with no swelling nor erythema. Able to make fist, oppose thumb to small finger and abduct fingers, no overt atrophy. +Phalen's, -tinel at carpal, -tinel at Guyon, -tinel at cubital. -froment, -wartenberg. Intact sensation in median and intact at superficial radial and intact in small and ulnar ring finger(normal at ulnar hand) prior to provocative testing. <2sec cap refill. \par \par Left wrist radiographs with no fracture nor dislocation. Carpus aligned.

## 2023-02-10 NOTE — ASSESSMENT
[FreeTextEntry1] : Bilateral CTS - reviewed pathoanatomy. Encouraged nighttime cockup wrist bracing. Reviewed bilateral UE EMG/NCV findings of bilateral mild CTS. Patient with increased severity of numbness that do not coincide with mild CTS. As such, will obtain C-spine MRI without contrast to investigate for radiculopathy. and followup thereafter. In light of discoloration of the fingers that be secondary to a vascular insult, possible Raynauds, recommended followup with vacular surgeon for workup - consider CTA upper extremities. Ring finger (R>L) appear to possess a hyperemia reflow.\par \par F/u after MRI
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
## 2023-02-11 ENCOUNTER — RESULT REVIEW (OUTPATIENT)
Age: 73
End: 2023-02-11

## 2023-02-17 ENCOUNTER — APPOINTMENT (OUTPATIENT)
Dept: ORTHOPEDIC SURGERY | Facility: CLINIC | Age: 73
End: 2023-02-17
Payer: COMMERCIAL

## 2023-02-17 DIAGNOSIS — M54.12 RADICULOPATHY, CERVICAL REGION: ICD-10-CM

## 2023-02-17 PROCEDURE — 99072 ADDL SUPL MATRL&STAF TM PHE: CPT

## 2023-02-17 PROCEDURE — 99214 OFFICE O/P EST MOD 30 MIN: CPT

## 2023-02-17 NOTE — ASSESSMENT
[FreeTextEntry1] : Right> Left C-spine radiculopathy and right mild CTS - reviewed pathoanatomy and MRI. Encouraged nighttime cockup wrist bracing. Reviewed bilateral UE EMG/NCV findings of bilateral mild CTS. Patient with increased severity of numbness that do not coincide with mild CTS. In light of discoloration of the fingers that be secondary to a vascular insult, possible Raynauds, recommended followup with vacular surgeon for workup - consider CTA upper extremities. Ring finger (R>L) appear to possess a hyperemia reflow.\par \par F/u with spine surgeon for C-spine AND vascular surgeon for vascular insult to hands.
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
## 2023-02-17 NOTE — IMAGING
[de-identified] : Right wrist with no swelling nor erythema. Able to make fist, oppose thumb to small finger and abduct fingers, no overt atrophy. +Phalen's, -tinel at carpal, -tinel at Guyon, -tinel at cubital. -froment, -wartenberg. Intact sensation in median and intact at superficial radial and intact in small and ulnar ring finger(normal at ulnar hand) prior to provocative testing. <2sec cap refill. \par \par Right wrist radiographs with no fracture nor dislocation. Carpus aligned.\par Right C-spine MRI with right C5-6 and bilateral C6-7 foraminal stenosis.\par \par Left wrist with no swelling nor erythema. Able to make fist, oppose thumb to small finger and abduct fingers, no overt atrophy. +Phalen's, -tinel at carpal, -tinel at Guyon, -tinel at cubital. -froment, -wartenberg. Intact sensation in median and intact at superficial radial and intact in small and ulnar ring finger(normal at ulnar hand) prior to provocative testing. <2sec cap refill. \par \par Left wrist radiographs with no fracture nor dislocation. Carpus aligned.

## 2023-02-17 NOTE — REASON FOR VISIT
[FreeTextEntry2] : MRI C SPINE IMPRESSION:\par Straightening of cervical lordosis.\par \par C3-C4: There is a disc bulge impressing upon the ventral thecal sac. Central\par spinal canal and neural foramens are patent.\par \par C4-C5: There is a disc bulge impressing upon the ventral thecal sac. Central\par spinal canal and neural foramens are patent.\par \par C5-C6: There is a disc bulge and right greater than left uncinate hypertrophy\par impressing upon the ventral thecal sac. There is moderate to severe right\par foraminal narrowing. Central spinal canal and left neural foramen are patent.\par \par C6-C7: There is a disc bulge impressing upon the ventral thecal sac. There is\par bilateral uncinate hypertrophy. There is mild bilateral foraminal narrowing.\par Central spinal canal is patent.\par

## 2023-02-21 ENCOUNTER — OFFICE (OUTPATIENT)
Dept: URBAN - METROPOLITAN AREA CLINIC 12 | Facility: CLINIC | Age: 73
Setting detail: OPHTHALMOLOGY
End: 2023-02-21
Payer: COMMERCIAL

## 2023-02-21 DIAGNOSIS — Z96.1: ICD-10-CM

## 2023-02-21 DIAGNOSIS — H26.493: ICD-10-CM

## 2023-02-21 PROCEDURE — 99024 POSTOP FOLLOW-UP VISIT: CPT | Performed by: OPTOMETRIST

## 2023-02-21 ASSESSMENT — REFRACTION_CURRENTRX
OS_ADD: +1.50
OD_CYLINDER: -1.25
OS_CYLINDER: -1.50
OD_AXIS: 22
OS_AXIS: 082
OS_OVR_VA: 20/
OD_ADD: +1.50
OD_SPHERE: +5.75
OS_OVR_VA: 20/
OS_AXIS: 160
OS_VPRISM_DIRECTION: SV
OD_ADD: +2.25
OD_OVR_VA: 20/
OD_VPRISM_DIRECTION: SV
OS_ADD: +2.25
OS_VPRISM_DIRECTION: SV
OD_CYLINDER: +1.00
OS_VPRISM_DIRECTION: PROGS
OD_OVR_VA: 20/
OS_OVR_VA: 20/
OD_SPHERE: +1.50
OD_VPRISM_DIRECTION: SV
OD_OVR_VA: 20/
OS_SPHERE: +3.00
OS_SPHERE: -1.50
OD_AXIS: 113
OD_VPRISM_DIRECTION: PROGS
OS_CYLINDER: +1.00

## 2023-02-21 ASSESSMENT — REFRACTION_MANIFEST
OS_VA2: 20/20
OS_ADD: +2.25
OD_VA2: 20/20
OS_CYLINDER: SPHERE
OS_SPHERE: +1.25
OS_ADD: +2.50
OU_VA: 20/20-1
OD_SPHERE: PLANO
OU_VA: 20/20-1
OD_CYLINDER: -1.00
OD_ADD: +2.25
OS_CYLINDER: -2.00
OS_VA2: 20/20
OD_VA2: 20/20
OS_SPHERE: -0.25
OD_SPHERE: +4.25
OD_VA1: 20/NI
OD_VA1: 20/20
OD_ADD: +2.50
OS_VA1: 20/NI
OD_AXIS: 120
OD_CYLINDER: SPHERE
OS_AXIS: 085
OS_VA1: 20/20

## 2023-02-21 ASSESSMENT — REFRACTION_AUTOREFRACTION
OS_AXIS: 0
OS_CYLINDER: SPHERE
OD_SPHERE: PLANO
OS_SPHERE: -0.50
OD_AXIS: 002
OD_CYLINDER: -0.25

## 2023-02-21 ASSESSMENT — AXIALLENGTH_DERIVED
OS_AL: 23.4495
OD_AL: 22.2161

## 2023-02-21 ASSESSMENT — LID POSITION - DERMATOCHALASIS
OD_DERMATOCHALASIS: RUL 1+
OS_DERMATOCHALASIS: LUL 1+

## 2023-02-21 ASSESSMENT — LID EXAM ASSESSMENTS
OS_BLEPHARITIS: LUL 1+
OD_BLEPHARITIS: RUL 1+

## 2023-02-21 ASSESSMENT — CONFRONTATIONAL VISUAL FIELD TEST (CVF)
OD_FINDINGS: FULL
OS_FINDINGS: FULL

## 2023-02-21 ASSESSMENT — TONOMETRY
OS_IOP_MMHG: 11
OD_IOP_MMHG: 15

## 2023-02-21 ASSESSMENT — KERATOMETRY
OD_AXISANGLE_DEGREES: 020
OD_K1POWER_DIOPTERS: 43.00
OS_K2POWER_DIOPTERS: 44.25
OD_K2POWER_DIOPTERS: 44.00
OS_AXISANGLE_DEGREES: 173
OS_K1POWER_DIOPTERS: 43.00
METHOD_AUTO_MANUAL: AUTO

## 2023-02-21 ASSESSMENT — VISUAL ACUITY
OS_BCVA: 20/20-2
OD_BCVA: 20/25

## 2023-02-21 ASSESSMENT — SPHEQUIV_DERIVED
OD_SPHEQUIV: 3.75
OS_SPHEQUIV: 0.25

## 2023-05-20 ENCOUNTER — OFFICE (OUTPATIENT)
Dept: URBAN - METROPOLITAN AREA CLINIC 12 | Facility: CLINIC | Age: 73
Setting detail: OPHTHALMOLOGY
End: 2023-05-20
Payer: COMMERCIAL

## 2023-05-20 DIAGNOSIS — H02.834: ICD-10-CM

## 2023-05-20 DIAGNOSIS — H43.813: ICD-10-CM

## 2023-05-20 DIAGNOSIS — H02.831: ICD-10-CM

## 2023-05-20 DIAGNOSIS — H26.493: ICD-10-CM

## 2023-05-20 DIAGNOSIS — H01.004: ICD-10-CM

## 2023-05-20 DIAGNOSIS — Z96.1: ICD-10-CM

## 2023-05-20 DIAGNOSIS — H43.392: ICD-10-CM

## 2023-05-20 DIAGNOSIS — D31.32: ICD-10-CM

## 2023-05-20 DIAGNOSIS — H01.001: ICD-10-CM

## 2023-05-20 PROCEDURE — 92250 FUNDUS PHOTOGRAPHY W/I&R: CPT | Performed by: OPHTHALMOLOGY

## 2023-05-20 PROCEDURE — 99214 OFFICE O/P EST MOD 30 MIN: CPT | Performed by: OPHTHALMOLOGY

## 2023-05-20 ASSESSMENT — KERATOMETRY
OS_K2POWER_DIOPTERS: 44.50
OS_AXISANGLE_DEGREES: 173
OD_AXISANGLE_DEGREES: 018
OD_K2POWER_DIOPTERS: 44.25
OS_K1POWER_DIOPTERS: 42.50
OD_K1POWER_DIOPTERS: 42.50
METHOD_AUTO_MANUAL: AUTO

## 2023-05-20 ASSESSMENT — REFRACTION_CURRENTRX
OS_AXIS: 082
OS_OVR_VA: 20/
OD_AXIS: 113
OS_VPRISM_DIRECTION: PROGS
OD_CYLINDER: -1.25
OS_CYLINDER: +1.00
OS_CYLINDER: -1.50
OS_VPRISM_DIRECTION: SV
OS_VPRISM_DIRECTION: SV
OD_CYLINDER: +1.00
OS_OVR_VA: 20/
OD_ADD: +2.25
OD_OVR_VA: 20/
OD_OVR_VA: 20/
OS_OVR_VA: 20/
OD_SPHERE: +5.75
OD_VPRISM_DIRECTION: PROGS
OS_AXIS: 160
OD_SPHERE: +1.50
OS_ADD: +2.25
OD_VPRISM_DIRECTION: SV
OS_ADD: +1.50
OS_SPHERE: -1.50
OD_ADD: +1.50
OD_OVR_VA: 20/
OS_SPHERE: +3.00
OD_AXIS: 22
OD_VPRISM_DIRECTION: SV

## 2023-05-20 ASSESSMENT — REFRACTION_AUTOREFRACTION
OD_CYLINDER: -0.25
OS_SPHERE: PLANO
OD_AXIS: 085
OS_CYLINDER: -0.50
OS_AXIS: 096
OD_SPHERE: +0.50

## 2023-05-20 ASSESSMENT — TONOMETRY
OD_IOP_MMHG: 16
OS_IOP_MMHG: 15

## 2023-05-20 ASSESSMENT — REFRACTION_MANIFEST
OD_ADD: +2.25
OU_VA: 20/20-1
OS_SPHERE: -0.25
OD_VA2: 20/20
OD_CYLINDER: SPHERE
OS_VA2: 20/20
OS_VA1: 20/20
OS_SPHERE: +1.25
OS_VA2: 20/20
OD_AXIS: 120
OS_ADD: +2.25
OS_AXIS: 085
OD_SPHERE: PLANO
OD_VA2: 20/20
OD_ADD: +2.50
OD_VA1: 20/20
OS_CYLINDER: -2.00
OU_VA: 20/20-1
OD_CYLINDER: -1.00
OS_ADD: +2.50
OS_VA1: 20/NI
OS_CYLINDER: SPHERE
OD_VA1: 20/NI
OD_SPHERE: +4.25

## 2023-05-20 ASSESSMENT — VISUAL ACUITY
OS_BCVA: 20/20-1
OD_BCVA: 20/25-2

## 2023-05-20 ASSESSMENT — LID POSITION - DERMATOCHALASIS
OS_DERMATOCHALASIS: LUL 1+
OD_DERMATOCHALASIS: RUL 1+

## 2023-05-20 ASSESSMENT — CONFRONTATIONAL VISUAL FIELD TEST (CVF)
OS_FINDINGS: FULL
OD_FINDINGS: FULL

## 2023-05-20 ASSESSMENT — SPHEQUIV_DERIVED
OD_SPHEQUIV: 3.75
OS_SPHEQUIV: 0.25
OD_SPHEQUIV: 0.375

## 2023-05-20 ASSESSMENT — LID EXAM ASSESSMENTS
OS_BLEPHARITIS: LUL 1+
OD_BLEPHARITIS: RUL 1+

## 2023-05-20 ASSESSMENT — AXIALLENGTH_DERIVED
OS_AL: 23.4949
OD_AL: 22.2569
OD_AL: 23.4921

## 2023-07-07 ENCOUNTER — ASC (OUTPATIENT)
Dept: URBAN - METROPOLITAN AREA SURGERY 8 | Facility: SURGERY | Age: 73
Setting detail: OPHTHALMOLOGY
End: 2023-07-07
Payer: COMMERCIAL

## 2023-07-07 DIAGNOSIS — H26.492: ICD-10-CM

## 2023-07-07 PROCEDURE — 66821 AFTER CATARACT LASER SURGERY: CPT | Performed by: OPHTHALMOLOGY

## 2023-07-07 ASSESSMENT — CONFRONTATIONAL VISUAL FIELD TEST (CVF)
OD_FINDINGS: FULL
OS_FINDINGS: FULL

## 2023-07-10 ENCOUNTER — RX ONLY (RX ONLY)
Age: 73
End: 2023-07-10

## 2023-07-10 ASSESSMENT — REFRACTION_AUTOREFRACTION
OS_CYLINDER: -0.50
OS_AXIS: 096
OD_CYLINDER: -0.25
OS_SPHERE: PLANO
OD_AXIS: 085
OD_SPHERE: +0.50

## 2023-07-10 ASSESSMENT — SPHEQUIV_DERIVED
OD_SPHEQUIV: 0.375
OD_SPHEQUIV: 3.75
OS_SPHEQUIV: 0.25

## 2023-07-10 ASSESSMENT — REFRACTION_MANIFEST
OD_VA2: 20/20
OS_CYLINDER: SPHERE
OS_VA1: 20/20
OD_AXIS: 120
OS_VA2: 20/20
OS_SPHERE: +1.25
OD_SPHERE: PLANO
OS_ADD: +2.25
OS_SPHERE: -0.25
OS_VA1: 20/NI
OS_CYLINDER: -2.00
OD_VA1: 20/NI
OS_AXIS: 085
OD_CYLINDER: SPHERE
OU_VA: 20/20-1
OS_ADD: +2.50
OD_ADD: +2.25
OD_VA2: 20/20
OD_CYLINDER: -1.00
OD_ADD: +2.50
OD_SPHERE: +4.25
OU_VA: 20/20-1
OD_VA1: 20/20
OS_VA2: 20/20

## 2023-07-10 ASSESSMENT — REFRACTION_CURRENTRX
OS_VPRISM_DIRECTION: SV
OD_CYLINDER: +1.00
OD_VPRISM_DIRECTION: SV
OD_SPHERE: +5.75
OD_OVR_VA: 20/
OS_VPRISM_DIRECTION: PROGS
OD_OVR_VA: 20/
OD_OVR_VA: 20/
OD_ADD: +1.50
OS_CYLINDER: +1.00
OD_VPRISM_DIRECTION: PROGS
OS_AXIS: 160
OS_VPRISM_DIRECTION: SV
OD_VPRISM_DIRECTION: SV
OS_OVR_VA: 20/
OD_SPHERE: +1.50
OS_OVR_VA: 20/
OS_CYLINDER: -1.50
OS_SPHERE: +3.00
OD_ADD: +2.25
OD_AXIS: 22
OS_ADD: +1.50
OS_ADD: +2.25
OS_OVR_VA: 20/
OS_SPHERE: -1.50
OD_AXIS: 113
OS_AXIS: 082
OD_CYLINDER: -1.25

## 2023-07-10 ASSESSMENT — AXIALLENGTH_DERIVED
OD_AL: 23.4921
OD_AL: 22.2569
OS_AL: 23.4949

## 2023-07-10 ASSESSMENT — VISUAL ACUITY
OD_BCVA: 20/25-2
OS_BCVA: 20/20-1

## 2023-07-10 ASSESSMENT — KERATOMETRY
OD_K2POWER_DIOPTERS: 44.25
OS_AXISANGLE_DEGREES: 173
OS_K1POWER_DIOPTERS: 42.50
OD_K1POWER_DIOPTERS: 42.50
OD_AXISANGLE_DEGREES: 018
METHOD_AUTO_MANUAL: AUTO
OS_K2POWER_DIOPTERS: 44.50

## 2023-07-21 ENCOUNTER — ASC (OUTPATIENT)
Dept: URBAN - METROPOLITAN AREA SURGERY 8 | Facility: SURGERY | Age: 73
Setting detail: OPHTHALMOLOGY
End: 2023-07-21
Payer: COMMERCIAL

## 2023-07-21 ENCOUNTER — RX ONLY (RX ONLY)
Age: 73
End: 2023-07-21

## 2023-07-21 DIAGNOSIS — H26.491: ICD-10-CM

## 2023-07-21 PROCEDURE — 66821 AFTER CATARACT LASER SURGERY: CPT | Performed by: OPHTHALMOLOGY

## 2023-07-21 ASSESSMENT — VISUAL ACUITY
OS_BCVA: 20/20-1
OD_BCVA: 20/25-2

## 2023-07-21 ASSESSMENT — REFRACTION_MANIFEST
OS_ADD: +2.50
OS_CYLINDER: SPHERE
OD_CYLINDER: SPHERE
OS_AXIS: 085
OD_VA2: 20/20
OD_AXIS: 120
OD_SPHERE: PLANO
OS_SPHERE: -0.25
OS_VA1: 20/20
OD_VA1: 20/20
OS_VA1: 20/NI
OS_SPHERE: +1.25
OU_VA: 20/20-1
OD_VA2: 20/20
OS_CYLINDER: -2.00
OD_CYLINDER: -1.00
OD_VA1: 20/NI
OD_ADD: +2.25
OU_VA: 20/20-1
OD_ADD: +2.50
OS_ADD: +2.25
OD_SPHERE: +4.25
OS_VA2: 20/20
OS_VA2: 20/20

## 2023-07-21 ASSESSMENT — REFRACTION_CURRENTRX
OS_ADD: +1.50
OS_SPHERE: -1.50
OD_AXIS: 113
OS_CYLINDER: +1.00
OD_SPHERE: +1.50
OD_VPRISM_DIRECTION: SV
OD_OVR_VA: 20/
OD_VPRISM_DIRECTION: PROGS
OS_VPRISM_DIRECTION: SV
OS_OVR_VA: 20/
OD_ADD: +1.50
OD_VPRISM_DIRECTION: SV
OS_SPHERE: +3.00
OS_OVR_VA: 20/
OS_AXIS: 082
OS_AXIS: 160
OS_VPRISM_DIRECTION: PROGS
OD_OVR_VA: 20/
OS_CYLINDER: -1.50
OD_ADD: +2.25
OD_CYLINDER: -1.25
OD_AXIS: 22
OS_VPRISM_DIRECTION: SV
OD_SPHERE: +5.75
OS_OVR_VA: 20/
OD_CYLINDER: +1.00
OS_ADD: +2.25
OD_OVR_VA: 20/

## 2023-07-21 ASSESSMENT — REFRACTION_AUTOREFRACTION
OD_CYLINDER: -0.25
OD_AXIS: 085
OS_AXIS: 096
OD_SPHERE: +0.50
OS_CYLINDER: -0.50
OS_SPHERE: PLANO

## 2023-07-21 ASSESSMENT — SPHEQUIV_DERIVED
OD_SPHEQUIV: 0.375
OS_SPHEQUIV: 0.25
OD_SPHEQUIV: 3.75

## 2023-07-21 ASSESSMENT — CONFRONTATIONAL VISUAL FIELD TEST (CVF)
OS_FINDINGS: FULL
OD_FINDINGS: FULL

## 2023-07-21 ASSESSMENT — KERATOMETRY
OS_K2POWER_DIOPTERS: 44.50
OS_K1POWER_DIOPTERS: 42.50
OD_AXISANGLE_DEGREES: 018
OS_AXISANGLE_DEGREES: 173
METHOD_AUTO_MANUAL: AUTO
OD_K2POWER_DIOPTERS: 44.25
OD_K1POWER_DIOPTERS: 42.50

## 2023-07-21 ASSESSMENT — AXIALLENGTH_DERIVED
OD_AL: 23.4921
OS_AL: 23.4949
OD_AL: 22.2569

## 2023-08-05 ENCOUNTER — OFFICE (OUTPATIENT)
Dept: URBAN - METROPOLITAN AREA CLINIC 12 | Facility: CLINIC | Age: 73
Setting detail: OPHTHALMOLOGY
End: 2023-08-05
Payer: COMMERCIAL

## 2023-08-05 DIAGNOSIS — H26.491: ICD-10-CM

## 2023-08-05 PROCEDURE — 99024 POSTOP FOLLOW-UP VISIT: CPT | Performed by: OPTOMETRIST

## 2023-08-05 ASSESSMENT — REFRACTION_CURRENTRX
OS_AXIS: 082
OD_OVR_VA: 20/
OS_CYLINDER: +1.00
OD_CYLINDER: +1.00
OS_SPHERE: +3.00
OD_AXIS: 22
OD_AXIS: 113
OS_SPHERE: -1.50
OD_VPRISM_DIRECTION: PROGS
OD_SPHERE: +5.75
OS_VPRISM_DIRECTION: SV
OD_ADD: +1.50
OS_VPRISM_DIRECTION: PROGS
OD_OVR_VA: 20/
OS_OVR_VA: 20/
OD_SPHERE: +1.50
OS_VPRISM_DIRECTION: SV
OD_ADD: +2.25
OS_AXIS: 160
OD_OVR_VA: 20/
OS_ADD: +1.50
OD_VPRISM_DIRECTION: SV
OS_OVR_VA: 20/
OS_CYLINDER: -1.50
OD_VPRISM_DIRECTION: SV
OS_ADD: +2.25
OS_OVR_VA: 20/
OD_CYLINDER: -1.25

## 2023-08-05 ASSESSMENT — REFRACTION_AUTOREFRACTION
OS_SPHERE: -0.25
OD_CYLINDER: SPHERE
OD_SPHERE: +0.25
OS_AXIS: 129
OS_CYLINDER: -0.25

## 2023-08-05 ASSESSMENT — REFRACTION_MANIFEST
OS_ADD: +2.50
OD_CYLINDER: SPHERE
OD_ADD: +2.25
OU_VA: 20/20-1
OD_SPHERE: +4.25
OU_VA: 20/20-1
OS_SPHERE: -0.25
OS_SPHERE: +1.25
OD_VA2: 20/20
OD_AXIS: 120
OS_ADD: +2.25
OS_VA2: 20/20
OS_VA2: 20/20
OD_SPHERE: PLANO
OS_CYLINDER: SPHERE
OD_VA1: 20/20
OS_VA1: 20/NI
OD_ADD: +2.50
OS_CYLINDER: -2.00
OD_VA2: 20/20
OD_VA1: 20/NI
OS_AXIS: 085
OS_VA1: 20/20
OD_CYLINDER: -1.00

## 2023-08-05 ASSESSMENT — KERATOMETRY
METHOD_AUTO_MANUAL: AUTO
OD_K2POWER_DIOPTERS: 44.00
OS_K1POWER_DIOPTERS: 43.00
OS_AXISANGLE_DEGREES: 174
OS_K2POWER_DIOPTERS: 44.25
OD_AXISANGLE_DEGREES: 018
OD_K1POWER_DIOPTERS: 42.75

## 2023-08-05 ASSESSMENT — SPHEQUIV_DERIVED
OS_SPHEQUIV: 0.25
OD_SPHEQUIV: 3.75
OS_SPHEQUIV: -0.375

## 2023-08-05 ASSESSMENT — AXIALLENGTH_DERIVED
OD_AL: 22.2569
OS_AL: 23.4495
OS_AL: 23.6925

## 2023-08-05 ASSESSMENT — LID POSITION - DERMATOCHALASIS
OS_DERMATOCHALASIS: LUL 1+
OD_DERMATOCHALASIS: RUL 1+

## 2023-08-05 ASSESSMENT — LID EXAM ASSESSMENTS
OD_BLEPHARITIS: RUL 1+
OS_BLEPHARITIS: LUL 1+

## 2023-08-05 ASSESSMENT — CONFRONTATIONAL VISUAL FIELD TEST (CVF)
OS_FINDINGS: FULL
OD_FINDINGS: FULL

## 2023-08-05 ASSESSMENT — TONOMETRY
OS_IOP_MMHG: 12
OD_IOP_MMHG: 16

## 2023-08-05 ASSESSMENT — VISUAL ACUITY
OD_BCVA: 20/25+1
OS_BCVA: 20/20

## 2024-05-13 PROBLEM — H25.13 CATARACT SENILE NUCLEAR SCLEROSIS; BOTH EYES: Status: ACTIVE | Noted: 2024-05-13

## 2024-07-17 PROBLEM — H43.392 VITREOUS FLOATERS; LEFT EYE: Status: ACTIVE | Noted: 2024-07-17
